# Patient Record
Sex: FEMALE | Race: WHITE | NOT HISPANIC OR LATINO | Employment: OTHER | URBAN - NONMETROPOLITAN AREA
[De-identification: names, ages, dates, MRNs, and addresses within clinical notes are randomized per-mention and may not be internally consistent; named-entity substitution may affect disease eponyms.]

---

## 2017-01-26 ENCOUNTER — OFFICE VISIT (OUTPATIENT)
Dept: CARDIOLOGY | Facility: CLINIC | Age: 57
End: 2017-01-26

## 2017-01-26 VITALS
DIASTOLIC BLOOD PRESSURE: 73 MMHG | SYSTOLIC BLOOD PRESSURE: 112 MMHG | HEIGHT: 66 IN | OXYGEN SATURATION: 96 % | BODY MASS INDEX: 30.05 KG/M2 | WEIGHT: 187 LBS | HEART RATE: 76 BPM

## 2017-01-26 DIAGNOSIS — R53.83 OTHER FATIGUE: ICD-10-CM

## 2017-01-26 DIAGNOSIS — R07.9 CHEST PAIN, UNSPECIFIED TYPE: Primary | ICD-10-CM

## 2017-01-26 DIAGNOSIS — R06.02 SHORTNESS OF BREATH: ICD-10-CM

## 2017-01-26 PROCEDURE — 93000 ELECTROCARDIOGRAM COMPLETE: CPT | Performed by: PHYSICIAN ASSISTANT

## 2017-01-26 PROCEDURE — 99204 OFFICE O/P NEW MOD 45 MIN: CPT | Performed by: PHYSICIAN ASSISTANT

## 2017-01-26 RX ORDER — RIVAROXABAN 20 MG/1
TABLET, FILM COATED ORAL DAILY
COMMUNITY
Start: 2017-01-04

## 2017-01-26 RX ORDER — TRAZODONE HYDROCHLORIDE 150 MG/1
TABLET ORAL NIGHTLY
COMMUNITY
Start: 2017-01-25

## 2017-01-26 RX ORDER — BUPRENORPHINE HYDROCHLORIDE AND NALOXONE HYDROCHLORIDE DIHYDRATE 8; 2 MG/1; MG/1
TABLET SUBLINGUAL 2 TIMES DAILY
COMMUNITY
Start: 2017-01-20

## 2017-01-26 RX ORDER — RANITIDINE 150 MG/1
TABLET ORAL 2 TIMES DAILY
COMMUNITY
Start: 2017-01-04

## 2017-01-26 RX ORDER — NITROGLYCERIN 0.4 MG/1
TABLET SUBLINGUAL
Qty: 100 TABLET | Refills: 11 | Status: SHIPPED | OUTPATIENT
Start: 2017-01-26

## 2017-01-26 RX ORDER — GABAPENTIN 600 MG/1
TABLET ORAL 3 TIMES DAILY
COMMUNITY
Start: 2017-01-25 | End: 2020-09-03 | Stop reason: ALTCHOICE

## 2017-01-26 RX ORDER — FLUCONAZOLE 200 MG/1
TABLET ORAL DAILY
COMMUNITY
Start: 2017-01-20 | End: 2017-04-25

## 2017-01-26 NOTE — MR AVS SNAPSHOT
Carmen BriceSalamanca   1/26/2017 10:30 AM   Office Visit    Dept Phone:  [phone]   Encounter #:  [id number]    Provider:  RYLAN Celestin   Department:  [facility] CARDIOLOGY                Your Full Care Plan              Today's Medication Changes          These changes are accurate as of: 1/26/17 11:22 AM.  If you have any questions, ask your nurse or doctor.               New Medication(s)Ordered:     nitroglycerin 0.4 MG SL tablet   Commonly known as:  NITROSTAT   1 under the tongue as needed for angina, may repeat q5mins for up three doses   Started by:  RYLAN Celestin            Where to Get Your Medications      [facility] [address] [facility] [address] [place] [address] - [phone]  - [phone]   [address] [address]     Phone:  [phone]     nitroglycerin 0.4 MG SL tablet                  Your Updated Medication List          This list is accurate as of: 1/26/17 11:22 AM.  Always use your most recent med list.                aspirin 81 MG tablet       buprenorphine-naloxone 8-2 MG per SL tablet   Commonly known as:  SUBOXONE       fluconazole 200 MG tablet   Commonly known as:  DIFLUCAN       gabapentin 600 MG tablet   Commonly known as:  NEURONTIN       metoprolol tartrate 25 MG tablet   Commonly known as:  LOPRESSOR       nitroglycerin 0.4 MG SL tablet   Commonly known as:  NITROSTAT   1 under the tongue as needed for angina, may repeat q5mins for up three doses       raNITIdine 150 MG tablet   Commonly known as:  ZANTAC       traZODone 150 MG tablet   Commonly known as:  DESYREL       VENTOLIN  (90 BASE) MCG/ACT inhaler   Generic drug:  albuterol       XARELTO 20 MG tablet   Generic drug:  rivaroxaban               We Performed the Following     ECG 12 Lead       You Were Diagnosed With        Codes Comments    Chest pain, unspecified type    -  Primary ICD-10-CM: R07.9  ICD-9-CM: 786.50  "    Shortness of breath     ICD-10-CM: R06.02  ICD-9-CM: 786.05     Other fatigue     ICD-10-CM: R53.83  ICD-9-CM: 780.79       Instructions     None    Patient Instructions History      Upcoming Appointments     Visit Type Date Time Department    NEW PATIENT 2017 10:30 AM [facility] CARD DAILY [facility]      [employer] Signup     [facility] [employer] allows you to send messages to your doctor, view your test results, renew your prescriptions, schedule appointments, and more. To sign up, go to [employer] and click on the Sign Up Now link in the New User? box. Enter your [employer] Activation Code exactly as it appears below along with the last four digits of your Social Security Number and your Date of Birth () to complete the sign-up process. If you do not sign up before the expiration date, you must request a new code.    [employer] Activation Code: [email or web address]  Expires: 2017 11:21 AM    If you have questions, you can email [email or web address] or call [phone] to talk to our [employer] staff. Remember, [employer] is NOT to be used for urgent needs. For medical emergencies, dial 911.               Other Info from Your Visit           Allergies     No Known Allergies      Reason for Visit     Chest Pain Referred by Rodolfo Dowling MD    Shortness of Breath           Vital Signs     Blood Pressure Pulse Height Weight Oxygen Saturation Body Mass Index    112/73 76 66\" (167.6 cm) 187 lb (84.8 kg) 96% 30.18 kg/m2    Smoking Status                   Current Every Day Smoker           Problems and Diagnoses Noted     Chest pain    Tiredness    Shortness of breath        "

## 2017-01-26 NOTE — LETTER
"January 26, 2017     Rodolfo Dowling MD  [address] [facility] [address]    Patient: Carmen Salamanca   YOB: 1960   Date of Visit: 1/26/2017       Dear Dr. Josey MD:    Carmen Salamanca was in my office today. Below is a copy of my note.    If you have questions, please do not hesitate to call me. I look forward to following Carmen along with you.         Sincerely,        RYLAN Zhu        CC: No Recipients    Subjective   Carmen Salamanca is a 56 y.o. female     Chief Complaint   Patient presents with   • Chest Pain     Referred by Rodolfo Dowling MD   • Shortness of Breath       HPI    Problem list  1. History of field dependence  2. DVT on chronic anticoagulation  3. GERD  4. Chronic tobacco habituation  5. Chest pain  6. Extensive family history of premature coronary artery disease    Patient is a 56-year-old female that presents to our office for initial evaluation due to chest pain. She states that she was at her primary care provider's office whenever she had chest discomfort that she describes as a \"sledgehammer hit her in the chest\". She had shortness of breath with this event. EKG was unremarkable but was sent via EMS to a local emergency room in which enzymes were negative and she was sent home for further evaluation. She describes a episodic chest discomfort since that time but not as severe. She gets a weakness and diaphoresis at times. She has mild dyspnea with exertion but nothing has been progressive. She denies any failure dysrhythmic symptoms. Otherwise she voices no complaints      Current Outpatient Prescriptions   Medication Sig Dispense Refill   • buprenorphine-naloxone (SUBOXONE) 8-2 MG per SL tablet 2 (Two) Times a Day.     • fluconazole (DIFLUCAN) 200 MG tablet Daily.     • gabapentin (NEURONTIN) 600 MG tablet 3 (Three) Times a Day.     • metoprolol tartrate (LOPRESSOR) 25 MG tablet 2 (Two) Times a Day.     • raNITIdine (ZANTAC) 150 MG tablet " "2 (Two) Times a Day.     • traZODone (DESYREL) 150 MG tablet Every Night.     • VENTOLIN  (90 BASE) MCG/ACT inhaler As Needed.     • XARELTO 20 MG tablet Daily.     • aspirin 81 MG tablet Take 1 tablet by mouth Daily. 30 tablet 11   • nitroglycerin (NITROSTAT) 0.4 MG SL tablet 1 under the tongue as needed for angina, may repeat q5mins for up three doses 100 tablet 11     No current facility-administered medications for this visit.        Review of patient's allergies indicates no known allergies.    Past Medical History   Diagnosis Date   • Chronic back pain    • Deep vein thrombosis    • Methadone dependence    • Pulmonary emboli        Social History     Social History   • Marital status: Single     Spouse name: N/A   • Number of children: N/A   • Years of education: N/A     Occupational History   • Not on file.     Social History Main Topics   • Smoking status: Current Every Day Smoker     Packs/day: 1.50     Years: 40.00     Types: Cigarettes   • Smokeless tobacco: Not on file   • Alcohol use No   • Drug use: No   • Sexual activity: Not on file     Other Topics Concern   • Not on file     Social History Narrative   • No narrative on file       @[email or web address]@    Review of Systems   Constitutional: Negative.    HENT: Negative.    Eyes: Negative.    Respiratory: Positive for shortness of breath.    Cardiovascular: Positive for chest pain.   Gastrointestinal: Negative.    Endocrine: Negative.    Musculoskeletal: Positive for back pain.   Skin: Negative.    Neurological: Positive for numbness.   Hematological: Negative.    Psychiatric/Behavioral: Negative.        Objective     Visit Vitals   • /73   • Pulse 76   • Ht 66\" (167.6 cm)   • Wt 187 lb (84.8 kg)   • SpO2 96%   • BMI 30.18 kg/m2       Lab Results (most recent)     None          Physical Exam   Constitutional: She is oriented to person, place, and time. She appears well-developed and well-nourished. No distress.   HENT:   Head: Normocephalic and " atraumatic.   Eyes: EOM are normal. Pupils are equal, round, and reactive to light.   Neck: No JVD present.   Cardiovascular: Normal rate, regular rhythm and normal heart sounds.  Exam reveals no gallop and no friction rub.    No murmur heard.  Pulmonary/Chest: Effort normal and breath sounds normal. No respiratory distress. She has no wheezes. She has no rales. She exhibits no tenderness.   Musculoskeletal: Normal range of motion. She exhibits no edema.   Neurological: She is alert and oriented to person, place, and time. No cranial nerve deficit.   Skin: Skin is warm and dry. No rash noted. No erythema. No pallor.   Psychiatric: She has a normal mood and affect. Her behavior is normal.   Nursing note and vitals reviewed.      Procedure     ECG 12 Lead  Date/Time: 1/26/2017 10:58 AM  Performed by: IMANI GALINDO  Authorized by: IMANI GALINDO   Comments: EKG indication chest pain     EKG indicates sinus mechanism at 63 bpm, first-degree AV block, otherwise unremarkable                 Assessment/Plan     Problems Addressed this Visit        Respiratory    Shortness of breath    Relevant Orders    Stress Test With Myocardial Perfusion One Day    Adult Transthoracic Echo Complete       Nervous and Auditory    Chest pain - Primary    Relevant Orders    ECG 12 Lead    Stress Test With Myocardial Perfusion One Day    Adult Transthoracic Echo Complete       Other    Fatigue    Relevant Orders    Stress Test With Myocardial Perfusion One Day    Adult Transthoracic Echo Complete                Recommendations  1. Patient has chest pain concerning for angina with significant risk factors including postmenopausal state, chronic tobacco use, and extensive family history of premature coronary artery disease. We will schedule for Lexiscan stress test for evaluation and echocardiogram to evaluate systolic and diastolic function. I prescribed aspirin for to take daily and given nitroglycerin as needed for chest pain. We  will see her back follow-up of above testing. Follow-up primary as scheduled

## 2017-01-26 NOTE — PROGRESS NOTES
"Subjective   Carmen Salamanca is a 56 y.o. female     Chief Complaint   Patient presents with   • Chest Pain     Referred by Rodolfo Dowling MD   • Shortness of Breath       HPI    Problem list  1. History of field dependence  2. DVT on chronic anticoagulation  3. GERD  4. Chronic tobacco habituation  5. Chest pain  6. Extensive family history of premature coronary artery disease    Patient is a 56-year-old female that presents to our office for initial evaluation due to chest pain. She states that she was at her primary care provider's office whenever she had chest discomfort that she describes as a \"sledgehammer hit her in the chest\". She had shortness of breath with this event. EKG was unremarkable but was sent via EMS to a local emergency room in which enzymes were negative and she was sent home for further evaluation. She describes a episodic chest discomfort since that time but not as severe. She gets a weakness and diaphoresis at times. She has mild dyspnea with exertion but nothing has been progressive. She denies any failure dysrhythmic symptoms. Otherwise she voices no complaints      Current Outpatient Prescriptions   Medication Sig Dispense Refill   • buprenorphine-naloxone (SUBOXONE) 8-2 MG per SL tablet 2 (Two) Times a Day.     • fluconazole (DIFLUCAN) 200 MG tablet Daily.     • gabapentin (NEURONTIN) 600 MG tablet 3 (Three) Times a Day.     • metoprolol tartrate (LOPRESSOR) 25 MG tablet 2 (Two) Times a Day.     • raNITIdine (ZANTAC) 150 MG tablet 2 (Two) Times a Day.     • traZODone (DESYREL) 150 MG tablet Every Night.     • VENTOLIN  (90 BASE) MCG/ACT inhaler As Needed.     • XARELTO 20 MG tablet Daily.     • aspirin 81 MG tablet Take 1 tablet by mouth Daily. 30 tablet 11   • nitroglycerin (NITROSTAT) 0.4 MG SL tablet 1 under the tongue as needed for angina, may repeat q5mins for up three doses 100 tablet 11     No current facility-administered medications for this visit.        Review of " "patient's allergies indicates no known allergies.    Past Medical History   Diagnosis Date   • Chronic back pain    • Deep vein thrombosis    • Methadone dependence    • Pulmonary emboli        Social History     Social History   • Marital status: Single     Spouse name: N/A   • Number of children: N/A   • Years of education: N/A     Occupational History   • Not on file.     Social History Main Topics   • Smoking status: Current Every Day Smoker     Packs/day: 1.50     Years: 40.00     Types: Cigarettes   • Smokeless tobacco: Not on file   • Alcohol use No   • Drug use: No   • Sexual activity: Not on file     Other Topics Concern   • Not on file     Social History Narrative   • No narrative on file       @[facility]@    Review of Systems   Constitutional: Negative.    HENT: Negative.    Eyes: Negative.    Respiratory: Positive for shortness of breath.    Cardiovascular: Positive for chest pain.   Gastrointestinal: Negative.    Endocrine: Negative.    Musculoskeletal: Positive for back pain.   Skin: Negative.    Neurological: Positive for numbness.   Hematological: Negative.    Psychiatric/Behavioral: Negative.        Objective     Visit Vitals   • /73   • Pulse 76   • Ht 66\" (167.6 cm)   • Wt 187 lb (84.8 kg)   • SpO2 96%   • BMI 30.18 kg/m2       Lab Results (most recent)     None          Physical Exam   Constitutional: She is oriented to person, place, and time. She appears well-developed and well-nourished. No distress.   HENT:   Head: Normocephalic and atraumatic.   Eyes: EOM are normal. Pupils are equal, round, and reactive to light.   Neck: No JVD present.   Cardiovascular: Normal rate, regular rhythm and normal heart sounds.  Exam reveals no gallop and no friction rub.    No murmur heard.  Pulmonary/Chest: Effort normal and breath sounds normal. No respiratory distress. She has no wheezes. She has no rales. She exhibits no tenderness.   Musculoskeletal: Normal range of motion. She exhibits no edema. "   Neurological: She is alert and oriented to person, place, and time. No cranial nerve deficit.   Skin: Skin is warm and dry. No rash noted. No erythema. No pallor.   Psychiatric: She has a normal mood and affect. Her behavior is normal.   Nursing note and vitals reviewed.      Procedure     ECG 12 Lead  Date/Time: 1/26/2017 10:58 AM  Performed by: IMANI GALINDO  Authorized by: IMANI GALINDO   Comments: EKG indication chest pain     EKG indicates sinus mechanism at 63 bpm, first-degree AV block, otherwise unremarkable                 Assessment/Plan     Problems Addressed this Visit        Respiratory    Shortness of breath    Relevant Orders    Stress Test With Myocardial Perfusion One Day    Adult Transthoracic Echo Complete       Nervous and Auditory    Chest pain - Primary    Relevant Orders    ECG 12 Lead    Stress Test With Myocardial Perfusion One Day    Adult Transthoracic Echo Complete       Other    Fatigue    Relevant Orders    Stress Test With Myocardial Perfusion One Day    Adult Transthoracic Echo Complete                Recommendations  1. Patient has chest pain concerning for angina with significant risk factors including postmenopausal state, chronic tobacco use, and extensive family history of premature coronary artery disease. We will schedule for Lexiscan stress test for evaluation and echocardiogram to evaluate systolic and diastolic function. I prescribed aspirin for to take daily and given nitroglycerin as needed for chest pain. We will see her back follow-up of above testing. Follow-up primary as scheduled

## 2017-02-09 ENCOUNTER — HOSPITAL ENCOUNTER (OUTPATIENT)
Dept: CARDIOLOGY | Facility: HOSPITAL | Age: 57
Discharge: HOME | End: 2017-02-09

## 2017-02-09 ENCOUNTER — OUTSIDE FACILITY SERVICE (OUTPATIENT)
Dept: CARDIOLOGY | Facility: CLINIC | Age: 57
End: 2017-02-09

## 2017-02-09 LAB
MAXIMAL PREDICTED HEART RATE: 166 BPM
STRESS TARGET HR: 141 BPM

## 2017-02-09 PROCEDURE — A9500 TC99M SESTAMIBI: HCPCS | Performed by: INTERNAL MEDICINE

## 2017-02-09 PROCEDURE — 93018 CV STRESS TEST I&R ONLY: CPT | Performed by: INTERNAL MEDICINE

## 2017-02-09 PROCEDURE — 0 TECHNETIUM SESTAMIBI: Performed by: INTERNAL MEDICINE

## 2017-02-09 PROCEDURE — 93306 TTE W/DOPPLER COMPLETE: CPT

## 2017-02-09 PROCEDURE — 25010000002 REGADENOSON 0.4 MG/5ML SOLUTION: Performed by: INTERNAL MEDICINE

## 2017-02-09 PROCEDURE — 93017 CV STRESS TEST TRACING ONLY: CPT

## 2017-02-09 PROCEDURE — 78452 HT MUSCLE IMAGE SPECT MULT: CPT | Performed by: INTERNAL MEDICINE

## 2017-02-09 PROCEDURE — 78452 HT MUSCLE IMAGE SPECT MULT: CPT

## 2017-02-09 PROCEDURE — 93306 TTE W/DOPPLER COMPLETE: CPT | Performed by: INTERNAL MEDICINE

## 2017-02-09 RX ADMIN — Medication 1 DOSE: at 08:45

## 2017-02-09 RX ADMIN — REGADENOSON 0.4 MG: 0.08 INJECTION, SOLUTION INTRAVENOUS at 08:45

## 2017-04-25 ENCOUNTER — OFFICE VISIT (OUTPATIENT)
Dept: CARDIOLOGY | Facility: CLINIC | Age: 57
End: 2017-04-25

## 2017-04-25 VITALS
OXYGEN SATURATION: 94 % | SYSTOLIC BLOOD PRESSURE: 125 MMHG | BODY MASS INDEX: 30.31 KG/M2 | HEIGHT: 66 IN | DIASTOLIC BLOOD PRESSURE: 84 MMHG | WEIGHT: 188.6 LBS | HEART RATE: 84 BPM

## 2017-04-25 DIAGNOSIS — R06.02 SHORTNESS OF BREATH: Primary | ICD-10-CM

## 2017-04-25 DIAGNOSIS — R53.83 OTHER FATIGUE: ICD-10-CM

## 2017-04-25 DIAGNOSIS — R07.9 CHEST PAIN, UNSPECIFIED TYPE: ICD-10-CM

## 2017-04-25 PROCEDURE — 99213 OFFICE O/P EST LOW 20 MIN: CPT | Performed by: PHYSICIAN ASSISTANT

## 2017-04-25 NOTE — PROGRESS NOTES
"Problem list     Subjective   Carmen Salamanca is a 56 y.o. female     Chief Complaint   Patient presents with   • Follow-up       HPI    Problem list  1. Chest pain  1.1 stress test report 2017 demonstrates no evidence of ischemia and preserved LV function  2. DVT on chronic anticoagulation  3. GERD  4. Chronic tobacco habituation  5. Chest pain  6. Extensive family history of premature coronary artery disease    Patient is a 56-year-old female that presents back for follow-up on stress test and echocardiogram. She states that her chest pain has resolved. She is not sure what was causing her chest discomfort but describes being at her primary office one day whenever she had the sudden onset of chest discomfort described as a \"sledgehammer\" hitting her chest. It was quick in nature and resolved. She had workup performed at primary which was unremarkable and sent to the emergency room which demonstrated no evidence of acute MI and she was discharged. She felt as if her pain is likely related to muscle spasm    She has had no chest discomfort since that time. She only has mild to moderate dyspnea at baseline and nothing that has been progressive. She denies PND orthopnea. She does not palpitate, have dizziness, presyncope or syncope and otherwise feels well     Echocardiogram demonstrated normal systolic function, mild diastolic dysfunction, mild TR but otherwise normal    Outpatient Encounter Prescriptions as of 4/25/2017   Medication Sig Dispense Refill   • buprenorphine-naloxone (SUBOXONE) 8-2 MG per SL tablet 2 (Two) Times a Day.     • gabapentin (NEURONTIN) 600 MG tablet 3 (Three) Times a Day.     • raNITIdine (ZANTAC) 150 MG tablet 2 (Two) Times a Day.     • traZODone (DESYREL) 150 MG tablet Every Night.     • VENTOLIN  (90 BASE) MCG/ACT inhaler As Needed.     • XARELTO 20 MG tablet Daily.     • nitroglycerin (NITROSTAT) 0.4 MG SL tablet 1 under the tongue as needed for angina, may repeat q5mins for up " "three doses 100 tablet 11   • [DISCONTINUED] aspirin 81 MG tablet Take 1 tablet by mouth Daily. 30 tablet 11   • [DISCONTINUED] fluconazole (DIFLUCAN) 200 MG tablet Daily.     • [DISCONTINUED] metoprolol tartrate (LOPRESSOR) 25 MG tablet 2 (Two) Times a Day.       No facility-administered encounter medications on file as of 4/25/2017.        Review of patient's allergies indicates no known allergies.    Past Medical History:   Diagnosis Date   • Chronic back pain    • Deep vein thrombosis    • Methadone dependence    • Pulmonary emboli        Social History     Social History   • Marital status: Single     Spouse name: N/A   • Number of children: N/A   • Years of education: N/A     Occupational History   • Not on file.     Social History Main Topics   • Smoking status: Current Every Day Smoker     Packs/day: 1.50     Years: 40.00     Types: Cigarettes   • Smokeless tobacco: Not on file   • Alcohol use No   • Drug use: No   • Sexual activity: Not on file     Other Topics Concern   • Not on file     Social History Narrative       Family History   Problem Relation Age of Onset   • Heart disease Mother    • Hypertension Mother    • Arrhythmia Mother    • Heart disease Father    • Heart attack Father    • Heart disease Sister    • Hypertension Sister    • Hypertension Brother    • Heart disease Brother        Review of Systems   Constitutional: Negative.    HENT: Negative.    Eyes: Positive for visual disturbance (wears glasses).   Respiratory: Positive for shortness of breath.    Gastrointestinal: Negative.    Endocrine: Negative.    Genitourinary: Negative.    Musculoskeletal: Positive for arthralgias and myalgias.   Skin: Negative.    Allergic/Immunologic: Positive for environmental allergies.   Neurological: Negative.    Hematological: Bruises/bleeds easily.   Psychiatric/Behavioral: Negative.        Objective     /84 (BP Location: Left arm, Patient Position: Sitting)  Pulse 84  Ht 66\" (167.6 cm)  Wt 188 lb " 9.6 oz (85.5 kg)  SpO2 94%  BMI 30.44 kg/m2    Lab Results (most recent)     None          Physical Exam   Constitutional: She is oriented to person, place, and time. She appears well-developed and well-nourished. No distress.   HENT:   Head: Normocephalic and atraumatic.   Eyes: EOM are normal. Pupils are equal, round, and reactive to light.   Neck: No JVD present.   Cardiovascular: Normal rate, regular rhythm and normal heart sounds.  Exam reveals no gallop and no friction rub.    No murmur heard.  Pulmonary/Chest: Effort normal and breath sounds normal. No respiratory distress. She has no wheezes. She has no rales. She exhibits no tenderness.   Abdominal: Soft.   Musculoskeletal: Normal range of motion. She exhibits no edema.   Neurological: She is alert and oriented to person, place, and time. No cranial nerve deficit.   Skin: Skin is warm and dry. No rash noted. No erythema. No pallor.   Psychiatric: She has a normal mood and affect. Her behavior is normal.   Nursing note and vitals reviewed.      Procedure   Procedures       Assessment/Plan     Problems Addressed this Visit        Respiratory    Shortness of breath - Primary       Nervous and Auditory    Chest pain       Other    Fatigue              Recommendations  1. From a cardiac standpoint, her chest pain is atypical. He had a sudden onset and resolves spontaneously. Cardiac workup has been benign and she has had no recurrent pain. I did discuss with her in great detail symptoms to be watchful for in regards to chest pain and shortness of breath. For now she states that she is feeling well and we will monitor her symptoms closely. If her symptoms return, she will contact our office as instructed to consider further evaluation. As of now, she states she is doing well. We will see her back for follow-up in 6 months to year. Follow-up with primary as scheduled

## 2020-09-02 RX ORDER — OXYBUTYNIN CHLORIDE 5 MG/1
5 TABLET ORAL 3 TIMES DAILY
COMMUNITY

## 2020-09-02 RX ORDER — SIMVASTATIN 20 MG
20 TABLET ORAL NIGHTLY
COMMUNITY

## 2020-09-02 RX ORDER — FAMOTIDINE 20 MG/1
20 TABLET, FILM COATED ORAL 2 TIMES DAILY
COMMUNITY

## 2020-09-02 RX ORDER — ASPIRIN 81 MG/1
81 TABLET ORAL DAILY
COMMUNITY

## 2020-09-03 ENCOUNTER — OFFICE VISIT (OUTPATIENT)
Dept: SURGERY | Facility: CLINIC | Age: 60
End: 2020-09-03

## 2020-09-03 VITALS
WEIGHT: 160 LBS | TEMPERATURE: 98.3 F | HEART RATE: 89 BPM | BODY MASS INDEX: 25.71 KG/M2 | SYSTOLIC BLOOD PRESSURE: 154 MMHG | OXYGEN SATURATION: 97 % | RESPIRATION RATE: 17 BRPM | HEIGHT: 66 IN | DIASTOLIC BLOOD PRESSURE: 90 MMHG

## 2020-09-03 DIAGNOSIS — S60.450A FOREIGN BODY OF RIGHT INDEX FINGER: Primary | ICD-10-CM

## 2020-09-03 PROCEDURE — 10120 INC&RMVL FB SUBQ TISS SMPL: CPT | Performed by: SURGERY

## 2020-09-03 RX ORDER — OMEPRAZOLE 20 MG/1
CAPSULE, DELAYED RELEASE ORAL
COMMUNITY
Start: 2020-08-21

## 2020-09-03 RX ORDER — GABAPENTIN 800 MG/1
TABLET ORAL
COMMUNITY
Start: 2020-08-22

## 2020-09-03 NOTE — PROGRESS NOTES
Date of Service: 9/3/2020    Subjective   Carmen Salamanca is a 59 y.o. female is being seen for consultation for foreign body right index finger today at the request of Neelima Epperson APRN    Chief complaint: Foreign body right index finger    Carmen Salamanca is a 59 y.o. female who presents with a foreign body in the palmar aspect of her right index finger overlying the distal phalanx.  In May she was cleaning up in her garden and got a renetta thorn in her right index finger pad.  She was unable to remove it.  She denies fevers, chills, drainage.  It progressively become more swollen and painful.  This made it difficult to use her right index finger    Past Medical History:   Diagnosis Date   • Ankylosing spondylitis (CMS/HCC)    • Arthritis    • Chronic back pain    • DDD (degenerative disc disease), lumbar    • DDD (degenerative disc disease), lumbar    • DDD (degenerative disc disease), lumbar    • Deep vein thrombosis (CMS/HCC)    • GERD (gastroesophageal reflux disease)    • Hyperlipidemia    • Methadone dependence (CMS/HCC)    • Pulmonary emboli (CMS/HCC)    • RA (rheumatoid arthritis) (CMS/HCC)        Past Surgical History:   Procedure Laterality Date   • CYST REMOVAL     • SKIN LESION EXCISION      PRECANCER   • TUBAL ABDOMINAL LIGATION           Family History   Problem Relation Age of Onset   • Heart disease Mother    • Hypertension Mother    • Arrhythmia Mother    • Heart disease Father    • Heart attack Father    • Heart disease Sister    • Hypertension Sister    • Hypertension Brother    • Heart disease Brother          Social History     Socioeconomic History   • Marital status: Single     Spouse name: Not on file   • Number of children: Not on file   • Years of education: Not on file   • Highest education level: Not on file   Tobacco Use   • Smoking status: Heavy Tobacco Smoker     Packs/day: 1.50     Years: 40.00     Pack years: 60.00     Types: Cigarettes   • Smokeless tobacco: Never Used  "  Substance and Sexual Activity   • Alcohol use: No   • Drug use: No   • Sexual activity: Defer                Review of Systems      Constitutional: No fevers, chills or malaise.    Musculoskeletal: Foreign body right index finger with pain and swelling              Neurologic: No paresthesias or loss of function        Objective     Physical Exam:      09/03/20  1431   Weight: 72.6 kg (160 lb)    Body mass index is 25.82 kg/m².  Constitution: /90   Pulse 89   Temp 98.3 °F (36.8 °C)   Resp 17   Ht 167.6 cm (66\")   Wt 72.6 kg (160 lb)   SpO2 97%   BMI 25.82 kg/m²  . No acute distress  Head: Normocephalic, bruising over right eyebrow  Eyes: Aligned without strabismus. Conjunctiva noninjected   Skin: Evidence of digital clubbing.  On the palmar aspect of the distal second phalanx there was a callus which was tender to palpation and swollen compared to the contralateral side.  No significant erythema, no drainage.  There appeared to be a foreign body in the center of the callus.  Lymphatics: No abnormal lymphadenopathy in the right upper extremity  Neurologic: No gross deficits.  Patient was motor and sensory intact in the radial, ulnar, and median nerve distributions  Psychiatric: alert and oriented x3          Procedure:    Risks and benefits of local excision under local anesthetic were discussed with the patient and consent was obtained.    Patient's right index finger was prepped and draped in sterile manner.  An anesthetic block of the right index finger was made by injecting a total of 9 cc of 1% lidocaine on the proximal portion of the right index finger on both the medial and lateral aspect.  Once numb, a 0.5 x 0.25 cm elliptical incision was made encompassing the callus with a 15 blade scalpel.  This was then excised and had with what appeared to be a tiny foreign body.  Hemostasis was achieved with direct pressure.  The incision was closed with 2, 4-0 chromic sutures.  The incision was " dressed.  Patient tolerated the procedure well and there were no immediate complications        Assessment     Carmen Salamanca is a 59 y.o. female with foreign body in right index finger.    This was removed in the office under local anesthetic.  She was instructed on wound care as well as reasons to call the office or present to the emergency department.  No antibiotics were given at this time               Vasyl Kumari MD  [facility]

## 2020-10-21 ENCOUNTER — TELEPHONE (OUTPATIENT)
Dept: SURGERY | Facility: CLINIC | Age: 60
End: 2020-10-21

## 2020-10-21 NOTE — TELEPHONE ENCOUNTER
Double checked on previous in office procedure. No Auth was Req for FB removal Index Finger.  [email or web address]

## 2024-07-30 LAB
MAXIMAL PREDICTED HEART RATE: 166 BPM
STRESS TARGET HR: 141 BPM